# Patient Record
(demographics unavailable — no encounter records)

---

## 2024-10-23 NOTE — DISCUSSION/SUMMARY
[Normal Growth] : growth [Normal Development] : development [None] : No known medical problems [No Elimination Concerns] : elimination [No Feeding Concerns] : feeding [No Skin Concerns] : skin [Normal Sleep Pattern] : sleep [Family Routines] : family routines [Language Promotion and Communication] : language promotion and communication [Social Development] : social development [ Considerations] :  considerations [Safety] : safety [No Medications] : ~He/She~ is not on any medications [Parent/Guardian] : parent/guardian [] : The components of the vaccine(s) to be administered today are listed in the plan of care. The disease(s) for which the vaccine(s) are intended to prevent and the risks have been discussed with the caretaker.  The risks are also included in the appropriate vaccination information statements which have been provided to the patient's caregiver.  The caregiver has given consent to vaccinate. [FreeTextEntry1] : Child is a 2.5 year old year old female here for [facility].  Continue cow's milk. Continue table foods, 3 meals with 2-3 snacks per day. Incorporate fluorinated water daily in a sippy cup. Brush teeth twice a day with soft toothbrush and fluoride toothpaste. Recommend visit to dentist. When in car, keep child in rear-facing car seats until age 2, or until the maximum height and weight for seat is reached. Put toddler to sleep in own bed. Help toddler to maintain consistent daily routines and sleep schedule. Toilet training discussed. Ensure home is safe. Use consistent, positive discipline. Read aloud to toddler. Limit screen time to no more than 2 hours per day.   Health maintenance - given flu and COVID today - routine labs - continue to monitor hemangioma   RTC in 6 mo for [facility].

## 2024-10-23 NOTE — HISTORY OF PRESENT ILLNESS
[Father] : father [Normal] : Normal [In bed] : In bed [Brushing teeth] : Brushing teeth [Yes] : Patient goes to dentist yearly [Tap water] : Primary Fluoride Source: Tap water [No] : No cigarette smoke exposure [Car seat in back seat] : Car seat in back seat [Smoke Detectors] : Smoke detectors [Up to date] : Up to date [de-identified] : picky eater [FreeTextEntry8] : potty training

## 2024-10-23 NOTE — HISTORY OF PRESENT ILLNESS
[Father] : father [Normal] : Normal [In bed] : In bed [Brushing teeth] : Brushing teeth [Yes] : Patient goes to dentist yearly [Tap water] : Primary Fluoride Source: Tap water [No] : No cigarette smoke exposure [Car seat in back seat] : Car seat in back seat [Smoke Detectors] : Smoke detectors [Up to date] : Up to date [de-identified] : picky eater [FreeTextEntry8] : potty training

## 2024-10-23 NOTE — DEVELOPMENTAL MILESTONES
[Normal Development] : Normal Development [None] : none [Urinates in a potty or toilet] : urinates in a potty or toilet [Plays pretend with toys or dolls] : plays pretend with toys or dolls [Pokes food with fork] : pokes food with fork [Uses pronouns correctly] : uses pronouns correctly [Explains the reason for things,] : explains the reason for things, such as needing a sweater when it's cold [Names at least one color] : names at least one color [Walks up steps, using one] : walks up steps, using one foot, then the other [Runs well without falling] : runs well without falling [Grasps crayon with thumb] : grasps crayon with thumb and fingers instead of fist [Copies a vertical line] : copies a vertical line

## 2024-10-23 NOTE — DISCUSSION/SUMMARY
[Normal Growth] : growth [Normal Development] : development [None] : No known medical problems [No Elimination Concerns] : elimination [No Feeding Concerns] : feeding [No Skin Concerns] : skin [Normal Sleep Pattern] : sleep [Family Routines] : family routines [Language Promotion and Communication] : language promotion and communication [Social Development] : social development [ Considerations] :  considerations [Safety] : safety [No Medications] : ~He/She~ is not on any medications [Parent/Guardian] : parent/guardian [] : The components of the vaccine(s) to be administered today are listed in the plan of care. The disease(s) for which the vaccine(s) are intended to prevent and the risks have been discussed with the caretaker.  The risks are also included in the appropriate vaccination information statements which have been provided to the patient's caregiver.  The caregiver has given consent to vaccinate. [FreeTextEntry1] : Child is a 2.5 year old year old female here for [facility].  Continue cow's milk. Continue table foods, 3 meals with 2-3 snacks per day. Incorporate fluorinated water daily in a sippy cup. Brush teeth twice a day with soft toothbrush and fluoride toothpaste. Recommend visit to dentist. When in car, keep child in rear-facing car seats until age 2, or until the maximum height and weight for seat is reached. Put toddler to sleep in own bed. Help toddler to maintain consistent daily routines and sleep schedule. Toilet training discussed. Ensure home is safe. Use consistent, positive discipline. Read aloud to toddler. Limit screen time to no more than 2 hours per day.   Health maintenance - given flu and COVID today - routine labs - continue to monitor hemangioma   RTC in 6 mo for [facility].

## 2024-10-23 NOTE — PHYSICAL EXAM

## 2024-10-23 NOTE — PHYSICAL EXAM

## 2025-02-28 NOTE — HISTORY OF PRESENT ILLNESS
[de-identified] : Fever [FreeTextEntry6] : Valeria is a 3 y/o female presenting for fever and ear pain. Fever for past 5 days with no fever this morning. With lots of congestion and decreased appetite. Started to complain of ear pain last night- does have hx of OM but not recently. Drinking and voiding well.

## 2025-02-28 NOTE — DISCUSSION/SUMMARY
[FreeTextEntry1] : Valeria is a 3 y/o female presenting for fever and ear pain. Physical exam with b/l OM. Discussed supportive care and amoxicillin course(risks and side effect profile) with parents and they endorsed understanding. RTO in 2-3 weeks for OM follow up or sooner if symptoms do not improve.

## 2025-03-14 NOTE — HISTORY OF PRESENT ILLNESS
[de-identified] : Follow up OM [FreeTextEntry6] : Valeria is a 2 y.o female presenting for follow up OM. No fever. Completed antibiotics course. Drinking and voiding well.

## 2025-03-14 NOTE — DISCUSSION/SUMMARY
[FreeTextEntry1] : Valeria is a 2 y.o female presenting for follow up OM. Small clear ME Effusion but otherwise normal exam. Recheck at 3yr [facility]. RTO as needed.

## 2025-04-11 NOTE — HISTORY OF PRESENT ILLNESS
[Parents] : parents [whole ___ oz/d] : consumes [unfilled] oz of whole cow's milk per day [Normal] : Normal [Sippy cup use] : Sippy cup use [Brushing teeth] : Brushing teeth [Yes] : Patient goes to dentist yearly [Toothpaste] : Primary Fluoride Source: Toothpaste [No] : Not at  exposure [Water heater temperature set at <120 degrees F] : Water heater temperature set at <120 degrees F [Car seat in back seat] : Car seat in back seat [Smoke Detectors] : Smoke detectors [Supervised play near cars and streets] : Supervised play near cars and streets [Carbon Monoxide Detectors] : Carbon monoxide detectors [Up to date] : Up to date [NO] : No [Appropiate parent-child communication] : Appropriate parent-child communication [Child given choices] : Child given choices [Exposure to electronic nicotine delivery system] : No exposure to electronic nicotine delivery system

## 2025-04-11 NOTE — PHYSICAL EXAM

## 2025-04-11 NOTE — DISCUSSION/SUMMARY
[Normal Growth] : growth [Normal Development] : development [None] : No known medical problems [No Elimination Concerns] : elimination [No Feeding Concerns] : feeding [No Skin Concerns] : skin [Normal Sleep Pattern] : sleep [Family Support] : family support [Encouraging Literacy Activities] : encouraging literacy activities [Playing with Peers] : playing with peers [Promoting Physical Activity] : promoting physical activity [Safety] : safety [No Medications] : ~He/She~ is not on any medications [Parent/Guardian] : parent/guardian [Mother] : mother [Father] : father [] : The components of the vaccine(s) to be administered today are listed in the plan of care. The disease(s) for which the vaccine(s) are intended to prevent and the risks have been discussed with the caretaker.  The risks are also included in the appropriate vaccination information statements which have been provided to the patient's caregiver.  The caregiver has given consent to vaccinate. [FreeTextEntry1] : Valeria is a 3 y.o female presenting for [facility]   Has been well in interval period Normal growth and development Normal physical exam- hemangioma fading  IUTD  RTO in 1 year for [facility] or as needed  Continue balanced diet with all food groups. Brush teeth twice a day with toothbrush. Recommend visit to dentist. As per car seat 's guidelines, use foward-facing car seat in back seat of car. Switch to booster seat when child reaches highest weight/height for seat. Child needs to ride in a belt-positioning booster seat until  4 feet 9 inches has been reached and are between 8 and 12 years of age. Put toddler to sleep in own bed. Help toddler to maintain consistent daily routines and sleep schedule. Pre-K discussed. Ensure home is safe. Use consistent, positive discipline. Read aloud to toddler. Limit screen time to no more than 2 hours per day.